# Patient Record
Sex: MALE | Race: BLACK OR AFRICAN AMERICAN | NOT HISPANIC OR LATINO | Employment: FULL TIME | URBAN - METROPOLITAN AREA
[De-identification: names, ages, dates, MRNs, and addresses within clinical notes are randomized per-mention and may not be internally consistent; named-entity substitution may affect disease eponyms.]

---

## 2024-04-05 ENCOUNTER — HOSPITAL ENCOUNTER (EMERGENCY)
Facility: HOSPITAL | Age: 24
Discharge: HOME | End: 2024-04-05
Attending: EMERGENCY MEDICINE
Payer: COMMERCIAL

## 2024-04-05 VITALS
SYSTOLIC BLOOD PRESSURE: 154 MMHG | HEART RATE: 87 BPM | TEMPERATURE: 98 F | OXYGEN SATURATION: 97 % | RESPIRATION RATE: 18 BRPM | BODY MASS INDEX: 33.31 KG/M2 | HEIGHT: 68 IN | DIASTOLIC BLOOD PRESSURE: 91 MMHG | WEIGHT: 219.81 LBS

## 2024-04-05 DIAGNOSIS — M79.641 RIGHT HAND PAIN: Primary | ICD-10-CM

## 2024-04-05 PROCEDURE — 29125 APPL SHORT ARM SPLINT STATIC: CPT | Mod: RT

## 2024-04-05 PROCEDURE — 99283 EMERGENCY DEPT VISIT LOW MDM: CPT | Mod: 25

## 2024-04-05 RX ORDER — IBUPROFEN 800 MG/1
800 TABLET ORAL EVERY 6 HOURS PRN
Qty: 20 TABLET | Refills: 0 | Status: SHIPPED | OUTPATIENT
Start: 2024-04-05

## 2024-04-05 NOTE — ED PROVIDER NOTES
Encounter Date: 4/5/2024       History     Chief Complaint   Patient presents with    Hand Injury     Pt c/o right hand swelling x1 day after hitting something. Pt unable to close fist completely. Pt c/o numbness and tingling, noted  bruising to palm.     24-year-old male presents emergency department complaints of right hand swelling and pain after punching a wall yesterday.  Patient reports associated numbness and tingling to the hand.  Patient denies any other injuries or symptoms.    The history is provided by the patient.     Review of patient's allergies indicates:  No Known Allergies  No past medical history on file.  No past surgical history on file.  No family history on file.     Review of Systems   Constitutional:  Negative for fever.   HENT:  Negative for sore throat.    Respiratory:  Negative for shortness of breath.    Cardiovascular:  Negative for chest pain.   Gastrointestinal:  Negative for nausea.   Genitourinary:  Negative for dysuria.   Musculoskeletal:  Negative for back pain.        +right hand pain   Skin:  Negative for rash.   Neurological:  Negative for weakness.   Hematological:  Does not bruise/bleed easily.   All other systems reviewed and are negative.      Physical Exam     Initial Vitals [04/05/24 1105]   BP Pulse Resp Temp SpO2   (!) 154/91 87 18 98.4 °F (36.9 °C) 97 %      MAP       --         Physical Exam    Constitutional: He appears well-developed and well-nourished. No distress.   HENT:   Head: Normocephalic and atraumatic.   Nose: Nose normal.   Mouth/Throat: Uvula is midline and oropharynx is clear and moist.   Eyes: Conjunctivae and EOM are normal. Pupils are equal, round, and reactive to light.   Neck: Neck supple.   Normal range of motion.  Cardiovascular:  Normal rate and regular rhythm.           Pulmonary/Chest: Effort normal and breath sounds normal. No respiratory distress. He has no decreased breath sounds. He has no wheezes. He has no rales.   Abdominal: Abdomen is  soft. Bowel sounds are normal. There is no abdominal tenderness.   Musculoskeletal:      Right hand: Swelling and tenderness present. No deformity or bony tenderness. Decreased range of motion. Normal sensation. There is no disruption of two-point discrimination. Normal capillary refill. Normal pulse.      Cervical back: Normal range of motion and neck supple.      Comments: There is moderate swelling and ecchymosis noted to the right hand both of the dorsum and palmar aspect, patient reports numbness and tingling to the right thumb, decreased range of motion of right thumb.  There is no deformity noted.     Neurological: He is alert and oriented to person, place, and time. He has normal strength. GCS eye subscore is 4. GCS verbal subscore is 5. GCS motor subscore is 6.   Skin: Skin is warm and dry. Capillary refill takes less than 2 seconds. No rash noted.   Psychiatric: He has a normal mood and affect. His speech is normal and behavior is normal.         ED Course   Procedures  Labs Reviewed - No data to display       Imaging Results              X-Ray Hand 3 view Right (Final result)  Result time 04/05/24 11:51:25      Final result by Jose C Oliveira MD (04/05/24 11:51:25)                   Impression:      No acute osseous findings.      Electronically signed by: Jose C Oliveira MD  Date:    04/05/2024  Time:    11:51               Narrative:    EXAMINATION:  XR HAND COMPLETE 3 VIEW RIGHT    CLINICAL HISTORY:  right hand injury;    TECHNIQUE:  Three views right hand.    COMPARISON:  None    FINDINGS:  Negative for acute fracture or dislocation.    No significant arthritic changes.    No suspicious focal bony abnormality.    Punctate soft tissue calcification adjacent to the base of the 5th metacarpal.                                       Medications - No data to display  Medical Decision Making  Amount and/or Complexity of Data Reviewed  Radiology: ordered.     Details: No acute osseous findings    Risk  Risk  Details: Place patient in a thumb spica splint.  Anti-inflammatories prescribed.  Referral placed for orthopedic follow up.                                      Clinical Impression:  Final diagnoses:  [M79.641] Right hand pain (Primary)          ED Disposition Condition    Discharge Stable          ED Prescriptions       Medication Sig Dispense Start Date End Date Auth. Provider    ibuprofen (ADVIL,MOTRIN) 800 MG tablet Take 1 tablet (800 mg total) by mouth every 6 (six) hours as needed for Pain. 20 tablet 4/5/2024 -- Vitor Briggs Jr., SENTHLI          Follow-up Information       Follow up With Specialties Details Why Contact Info    [facility] - Emergency Dept. Emergency Medicine  If symptoms worsen [id number] [facility] [address] [phone]             Vitor Briggs Jr., FNP  04/05/24 1458

## 2024-04-05 NOTE — Clinical Note
"Shamika"Brockhenryoksana Celaya was seen and treated in our emergency department on 4/5/2024.  He may return to work on 04/09/2024.       If you have any questions or concerns, please don't hesitate to call.      Vitor Briggs Jr., RUPALIP"

## 2024-04-08 ENCOUNTER — OFFICE VISIT (OUTPATIENT)
Dept: ORTHOPEDICS | Facility: CLINIC | Age: 24
End: 2024-04-08
Payer: COMMERCIAL

## 2024-04-08 ENCOUNTER — HOSPITAL ENCOUNTER (OUTPATIENT)
Dept: RADIOLOGY | Facility: HOSPITAL | Age: 24
Discharge: HOME | End: 2024-04-08
Attending: STUDENT IN AN ORGANIZED HEALTH CARE EDUCATION/TRAINING PROGRAM
Payer: COMMERCIAL

## 2024-04-08 ENCOUNTER — PATIENT MESSAGE (OUTPATIENT)
Dept: ORTHOPEDICS | Facility: CLINIC | Age: 24
End: 2024-04-08

## 2024-04-08 VITALS — HEIGHT: 68 IN | WEIGHT: 219.81 LBS | BODY MASS INDEX: 33.31 KG/M2

## 2024-04-08 DIAGNOSIS — M79.641 RIGHT HAND PAIN: Primary | ICD-10-CM

## 2024-04-08 DIAGNOSIS — M79.641 RIGHT HAND PAIN: ICD-10-CM

## 2024-04-08 DIAGNOSIS — S62.141A CLOSED DISPLACED FRACTURE OF HAMATE BONE OF RIGHT WRIST, UNSPECIFIED PORTION OF HAMATE, INITIAL ENCOUNTER: ICD-10-CM

## 2024-04-08 DIAGNOSIS — S63.8X1A CARPOMETACARPAL JOINT SPRAINS, RIGHT, INITIAL ENCOUNTER: ICD-10-CM

## 2024-04-08 DIAGNOSIS — S69.91XA HAND TRAUMA, RIGHT, INITIAL ENCOUNTER: ICD-10-CM

## 2024-04-08 DIAGNOSIS — S69.91XA HAND TRAUMA, RIGHT, INITIAL ENCOUNTER: Primary | ICD-10-CM

## 2024-04-08 PROCEDURE — 99204 OFFICE O/P NEW MOD 45 MIN: CPT | Mod: S$GLB,,, | Performed by: STUDENT IN AN ORGANIZED HEALTH CARE EDUCATION/TRAINING PROGRAM

## 2024-04-08 PROCEDURE — 73130 X-RAY EXAM OF HAND: CPT | Mod: TC,RT

## 2024-04-08 PROCEDURE — 1159F MED LIST DOCD IN RCRD: CPT | Mod: CPTII,S$GLB,, | Performed by: STUDENT IN AN ORGANIZED HEALTH CARE EDUCATION/TRAINING PROGRAM

## 2024-04-08 PROCEDURE — 3008F BODY MASS INDEX DOCD: CPT | Mod: CPTII,S$GLB,, | Performed by: STUDENT IN AN ORGANIZED HEALTH CARE EDUCATION/TRAINING PROGRAM

## 2024-04-08 PROCEDURE — 1160F RVW MEDS BY RX/DR IN RCRD: CPT | Mod: CPTII,S$GLB,, | Performed by: STUDENT IN AN ORGANIZED HEALTH CARE EDUCATION/TRAINING PROGRAM

## 2024-04-08 PROCEDURE — 73130 X-RAY EXAM OF HAND: CPT | Mod: 26,RT,, | Performed by: RADIOLOGY

## 2024-04-08 PROCEDURE — 99999 PR PBB SHADOW E&M-EST. PATIENT-LVL III: CPT | Mod: PBBFAC,,, | Performed by: STUDENT IN AN ORGANIZED HEALTH CARE EDUCATION/TRAINING PROGRAM

## 2024-04-08 PROCEDURE — 97760 ORTHOTIC MGMT&TRAING 1ST ENC: CPT | Mod: S$GLB,,, | Performed by: STUDENT IN AN ORGANIZED HEALTH CARE EDUCATION/TRAINING PROGRAM

## 2024-04-08 PROCEDURE — 73200 CT UPPER EXTREMITY W/O DYE: CPT | Mod: 26,RT,, | Performed by: RADIOLOGY

## 2024-04-08 PROCEDURE — 73200 CT UPPER EXTREMITY W/O DYE: CPT | Mod: TC,RT

## 2024-04-08 RX ORDER — AZELASTINE HYDROCHLORIDE 0.5 MG/ML
1 SOLUTION/ DROPS OPHTHALMIC 2 TIMES DAILY
COMMUNITY
Start: 2023-12-05

## 2024-04-08 RX ORDER — KETOROLAC TROMETHAMINE 10 MG/1
10 TABLET, FILM COATED ORAL
COMMUNITY
Start: 2024-04-05

## 2024-04-08 NOTE — PROGRESS NOTES
Hand Surgery Clinic Note    Chief Complaint  Chief Complaint   Patient presents with    Right Hand - Pain, Injury, Numbness    Right Wrist - Pain, Injury       History of Present Illness  24-year-old right-hand dominant male who has a  presents for evaluation of a right hand injury.  Four days ago, on 04/04/2024, patient punched a wall.  He subsequently presented to the emergency department the following day where x-rays were obtained.  Patient was told that there was no fracture and he was placed in a thumb spica brace.  It was difficult to discern whether there was a tristan dislocation event from the incident.  Patient states that he did notice a bump on his dorsal hand shortly following the incident.  There is no evidence of CMC dislocation on the x-rays which were taken in the emergency department.  Patient states he was some generalized tingling in all his fingers.  He rates his pain as an 8/10.    Review of Systems  Review of systems negative for chest pain, shortness of breath, fevers, chills, nausea/vomiting.    Past Medical History  No past medical history on file.    Past Surgical History  No past surgical history on file.    Allergies  Review of patient's allergies indicates:  No Known Allergies    Family History  No family history on file.    Social History  Social History     Socioeconomic History    Marital status: Single   Tobacco Use    Smoking status: Every Day     Types: Vaping with nicotine     Start date: 01/2023     Passive exposure: Past    Smokeless tobacco: Never       Vital Signs  There were no vitals filed for this visit.    Physical Exam  Constitutional: Appears well-developed and well-nourished. No distress.   HENT:   Head: Normocephalic.   Eyes: EOM are normal.   Pulmonary/Chest: Effort normal.   Neurological: Oriented to person, place, and time.   Psychiatric: Normal mood and affect.     Right Upper Extremity:  No abrasions, lacerations, wounds.  There is moderate swelling  generalized throughout the dorsum of the hand.  I do not palpate a dorsal prominence at the base of the metacarpals consistent with a CMC dislocation.  Patient was able to fully extend all his fingers.  When attempting to make a fist, his fingers are 1-2 cm off of the palm.  Patient demonstrates active flexion and extension at the MCP and IP joints of all 5 fingers.  No tenderness over the dorsal radiocarpal articulation.  No tenderness over the snuffbox.  No tenderness over the distal radius or distal ulna.  No tenderness over the TFCC.  Patient has tenderness over the 2nd-5th CMC joints. 2 point discrimination is 5mm in all 5 fingers.    Imaging  Right-hand x-rays three views were obtained today and independently reviewed by myself.  Additionally right-hand x-rays three views were obtained on 04/05/2024 and independently reviewed by myself.  No tristan CMC dislocation is noted on these x-rays.  There are small avulsion type fragments which are visualized along the distal carpal row, particularly just ulnar to the hamate on the AP view and potentially dorsal to the hamate and/or capitate on the lateral view.    Right hand CT scan without contrast was obtained today and independently reviewed by myself.  A small avulsion type fracture is visualized dorsal to the 3rd CMC joint.  There is possible minimal dorsal subluxation at the 4th CMC joint with an associated avulsion type fracture dorsal to the hamate.  Difficult to tell based on the angle of the sagittal CTs cuts if the 4th CMC joint is slightly subluxed versus reduced.  The 2nd, 3rd, and 5th CMC joints are reduced.    Assessment and Plan  24-year-old right-hand dominant male presents for evaluation of a right hand injury after he punched a wall on 04/04/2024.  The injury is at the CMC articulations.  There are small avulsion fragments visualized.  Difficult to tell if patient may have potentially had a 4th CMC dislocation event.  All of his CMC joints are reduced  today on CT scan though there may be some very minimal dorsal subluxation of the 4th CMC joint.  The CMC joints did not dislocate or sublux when patient attempted to make a fist in clinic today.  I fitted the patient for an Exos brace today.      At least 10 minutes were spent sizing, fitting, and educating for durable medical equipment application today.  This service was performed under the direction of Thao Knight MD.  CPT 67352.     I am going to have him follow up in 1 week with repeat x-rays of the right hand to confirm that the right joints are reduced.  I provided him with a number to the clinic and discussed that if he feels a clunk and a prominence over the dorsum of his hand concerning for a dislocation event, he should call the clinic to be seen the following day.  Follow up in 1 week for re-evaluation.  I also wrote a note for his work stating that he was nonweightbearing to the right upper extremity from the date of his injury (04/04/2024) until 4/15/24.  I will re-evaluate it was visit next week to determine next steps with the job.    Thao Knight MD  Orthopaedic Hand Surgery

## 2024-04-08 NOTE — LETTER
April 8, 2024      [facility]  [address] [facility]  [place] [address]  Phone: [phone]  Fax: [phone]       Patient: Shamika Celaya   YOB: 2000    To Whom It May Concern:    Tangela Celaya  was at [facility] on 04/08/2024. Due to his injury, the patient should not use the right hand from 4/4/2024 - 4/15/2024, at which time he will be re-evaluated. If you have any questions or concerns, or if I can be of further assistance, please do not hesitate to contact me.    Sincerely,      Thao Knight MD

## 2024-04-09 ENCOUNTER — PATIENT MESSAGE (OUTPATIENT)
Dept: ORTHOPEDICS | Facility: CLINIC | Age: 24
End: 2024-04-09
Payer: COMMERCIAL

## 2024-04-10 RX ORDER — TRAMADOL HYDROCHLORIDE 50 MG/1
50 TABLET ORAL EVERY 6 HOURS
Qty: 20 TABLET | Refills: 0 | Status: SHIPPED | OUTPATIENT
Start: 2024-04-10

## 2024-04-10 NOTE — PROGRESS NOTES
Patient called stating he is taking 800mg Ibuprofen with continued pain. One time script for tramadol sent to pharmacy.    Thao Knight MD  Orthopaedic Hand Surgery

## 2024-04-10 NOTE — TELEPHONE ENCOUNTER
Pt came by clinic to complete his portion of FMLA paperwork, advised that Dr. Knight states she will dend a one time Rx of tramadol to his pharm. Pt verbalized understanding and asked for print out of work excuse, which was provided.

## 2024-04-11 DIAGNOSIS — M79.641 RIGHT HAND PAIN: Primary | ICD-10-CM

## 2024-04-15 ENCOUNTER — HOSPITAL ENCOUNTER (OUTPATIENT)
Dept: RADIOLOGY | Facility: HOSPITAL | Age: 24
Discharge: HOME | End: 2024-04-15
Attending: STUDENT IN AN ORGANIZED HEALTH CARE EDUCATION/TRAINING PROGRAM
Payer: COMMERCIAL

## 2024-04-15 ENCOUNTER — OFFICE VISIT (OUTPATIENT)
Dept: ORTHOPEDICS | Facility: CLINIC | Age: 24
End: 2024-04-15
Payer: COMMERCIAL

## 2024-04-15 VITALS — BODY MASS INDEX: 33.31 KG/M2 | WEIGHT: 219.81 LBS | HEIGHT: 68 IN

## 2024-04-15 DIAGNOSIS — M79.641 RIGHT HAND PAIN: ICD-10-CM

## 2024-04-15 DIAGNOSIS — S62.141A CLOSED DISPLACED FRACTURE OF HAMATE BONE OF RIGHT WRIST, UNSPECIFIED PORTION OF HAMATE, INITIAL ENCOUNTER: Primary | ICD-10-CM

## 2024-04-15 DIAGNOSIS — M79.641 RIGHT HAND PAIN: Primary | ICD-10-CM

## 2024-04-15 PROCEDURE — 1160F RVW MEDS BY RX/DR IN RCRD: CPT | Mod: CPTII,S$GLB,, | Performed by: STUDENT IN AN ORGANIZED HEALTH CARE EDUCATION/TRAINING PROGRAM

## 2024-04-15 PROCEDURE — 99999 PR PBB SHADOW E&M-EST. PATIENT-LVL III: CPT | Mod: PBBFAC,,, | Performed by: STUDENT IN AN ORGANIZED HEALTH CARE EDUCATION/TRAINING PROGRAM

## 2024-04-15 PROCEDURE — 97760 ORTHOTIC MGMT&TRAING 1ST ENC: CPT | Mod: S$GLB,,, | Performed by: STUDENT IN AN ORGANIZED HEALTH CARE EDUCATION/TRAINING PROGRAM

## 2024-04-15 PROCEDURE — 1159F MED LIST DOCD IN RCRD: CPT | Mod: CPTII,S$GLB,, | Performed by: STUDENT IN AN ORGANIZED HEALTH CARE EDUCATION/TRAINING PROGRAM

## 2024-04-15 PROCEDURE — 73130 X-RAY EXAM OF HAND: CPT | Mod: TC,RT

## 2024-04-15 PROCEDURE — 99214 OFFICE O/P EST MOD 30 MIN: CPT | Mod: S$GLB,,, | Performed by: STUDENT IN AN ORGANIZED HEALTH CARE EDUCATION/TRAINING PROGRAM

## 2024-04-15 PROCEDURE — 73130 X-RAY EXAM OF HAND: CPT | Mod: 26,RT,, | Performed by: RADIOLOGY

## 2024-04-15 PROCEDURE — 3008F BODY MASS INDEX DOCD: CPT | Mod: CPTII,S$GLB,, | Performed by: STUDENT IN AN ORGANIZED HEALTH CARE EDUCATION/TRAINING PROGRAM

## 2024-04-15 NOTE — PROGRESS NOTES
Hand Surgery Clinic Follow Up Note    Chief Complaint  Chief Complaint   Patient presents with    Right Hand - Pain, Injury    Right Wrist - Pain, Injury       History of Present Illness  24-year-old right-hand dominant male presents for follow up.  He punched a wall on 04/04/2024, 11 days ago, and was found to have a right hand injury resulting in small avulsion fragments to the distal carpal row.  He has been in an Exos ulnar gutter brace.  He has not had any tristan dislocation events since he was last seen in clinic 1 week ago.  He has been nonweightbearing to the right upper extremity.  Paperwork for his job was filled out last week stating that he could return to work tomorrow but with lifting restrictions of less than 2 lb to the right hand from for 4/16/24 to 05/16/2024; also it was specified that patient allowed to wear his Velcro wrist brace as needed.    Review of Systems  Review of systems negative for chest pain, shortness of breath, fevers, chills, nausea/vomiting.    Vital Signs  There were no vitals filed for this visit.    Physical Exam  Constitutional: Appears well-developed and well-nourished. No distress.   HENT:   Head: Normocephalic.   Eyes: EOM are normal.   Pulmonary/Chest: Effort normal.   Neurological: Oriented to person, place, and time.   Psychiatric: Normal mood and affect.     Right Upper Extremity:  No abrasions, lacerations, wounds.  The swelling on the dorsum of the hand has resolved.  No erythema.  No drainage.  Patient was able to make a fist and fully extend all his fingers.  No palpable dorsal bony prominences at the CMC articulations. Patient demonstrates active flexion and extension at the MCP and IP joints of all 5 fingers.  No tenderness over the dorsal radiocarpal articulation.  No tenderness over the snuffbox.  No tenderness over the distal radius or distal ulna.  No tenderness over the TFCC.  Patient has mild tenderness over the 2nd-5th CMC joints and distal carpal row. 2  point discrimination is 5mm in all 5 fingers.     Imaging  Right-hand x-rays three views were obtained today and independently reviewed by myself.  No change in alignment of the CMC joints compared to x-rays taken 1 week ago. There are small avulsion type fragments which are visualized along the distal carpal row, particularly just ulnar to the hamate on the AP view and potentially dorsal to the hamate and/or capitate on the lateral view.     Assessment and Plan  24-year-old right-hand dominant male presents for follow up.  He sustained a right hand injury on 04/04/2024.  CT was obtained due to concern for possible CMC dislocation but this was found to be normal.  Patient has some avulsion fragments of bones of the distal carpal row.  Patient was transitioned to a Velcro wrist brace today.    At least 10 minutes were spent sizing, fitting, and educating for durable medical equipment application today.  This service was performed under the direction of Thao Knight MD.  CPT 40833.    No lifting greater than 2 lb at work from 04/16/2024 until 05/16/2024.  Wear the Velcro wrist brace as needed, patient should begin to wean off of this in the next couple of weeks.  Follow up in 4 weeks for re-evaluation with x-rays of the right hand.  Again, I discussed with the patient that if he experiences a CMC dislocation event (we discussed what this would feel and look like), he should go to the ER or call the clinic within 24 hours.      Thao Knight MD  Orthopaedic Hand Surgery

## 2024-04-18 ENCOUNTER — PATIENT MESSAGE (OUTPATIENT)
Dept: ORTHOPEDICS | Facility: CLINIC | Age: 24
End: 2024-04-18
Payer: COMMERCIAL

## 2024-04-23 ENCOUNTER — PATIENT MESSAGE (OUTPATIENT)
Dept: ORTHOPEDICS | Facility: CLINIC | Age: 24
End: 2024-04-23
Payer: COMMERCIAL

## 2024-04-25 ENCOUNTER — TELEPHONE (OUTPATIENT)
Dept: SPORTS MEDICINE | Facility: CLINIC | Age: 24
End: 2024-04-25
Payer: COMMERCIAL

## 2024-05-13 ENCOUNTER — PATIENT MESSAGE (OUTPATIENT)
Dept: ORTHOPEDICS | Facility: CLINIC | Age: 24
End: 2024-05-13

## 2024-05-13 ENCOUNTER — HOSPITAL ENCOUNTER (OUTPATIENT)
Dept: RADIOLOGY | Facility: HOSPITAL | Age: 24
Discharge: HOME | End: 2024-05-13
Attending: STUDENT IN AN ORGANIZED HEALTH CARE EDUCATION/TRAINING PROGRAM
Payer: COMMERCIAL

## 2024-05-13 ENCOUNTER — OFFICE VISIT (OUTPATIENT)
Dept: ORTHOPEDICS | Facility: CLINIC | Age: 24
End: 2024-05-13
Payer: COMMERCIAL

## 2024-05-13 VITALS — HEIGHT: 68 IN | BODY MASS INDEX: 33.31 KG/M2 | WEIGHT: 219.81 LBS

## 2024-05-13 DIAGNOSIS — S63.8X1A CARPOMETACARPAL JOINT SPRAINS, RIGHT, INITIAL ENCOUNTER: ICD-10-CM

## 2024-05-13 DIAGNOSIS — M79.641 RIGHT HAND PAIN: ICD-10-CM

## 2024-05-13 DIAGNOSIS — S62.141A CLOSED DISPLACED FRACTURE OF HAMATE BONE OF RIGHT WRIST, UNSPECIFIED PORTION OF HAMATE, INITIAL ENCOUNTER: Primary | ICD-10-CM

## 2024-05-13 DIAGNOSIS — M79.641 RIGHT HAND PAIN: Primary | ICD-10-CM

## 2024-05-13 PROCEDURE — 1160F RVW MEDS BY RX/DR IN RCRD: CPT | Mod: CPTII,S$GLB,, | Performed by: STUDENT IN AN ORGANIZED HEALTH CARE EDUCATION/TRAINING PROGRAM

## 2024-05-13 PROCEDURE — 99999 PR PBB SHADOW E&M-EST. PATIENT-LVL III: CPT | Mod: PBBFAC,,, | Performed by: STUDENT IN AN ORGANIZED HEALTH CARE EDUCATION/TRAINING PROGRAM

## 2024-05-13 PROCEDURE — 99213 OFFICE O/P EST LOW 20 MIN: CPT | Mod: S$GLB,,, | Performed by: STUDENT IN AN ORGANIZED HEALTH CARE EDUCATION/TRAINING PROGRAM

## 2024-05-13 PROCEDURE — 1159F MED LIST DOCD IN RCRD: CPT | Mod: CPTII,S$GLB,, | Performed by: STUDENT IN AN ORGANIZED HEALTH CARE EDUCATION/TRAINING PROGRAM

## 2024-05-13 PROCEDURE — 73130 X-RAY EXAM OF HAND: CPT | Mod: 26,RT,, | Performed by: RADIOLOGY

## 2024-05-13 PROCEDURE — 3008F BODY MASS INDEX DOCD: CPT | Mod: CPTII,S$GLB,, | Performed by: STUDENT IN AN ORGANIZED HEALTH CARE EDUCATION/TRAINING PROGRAM

## 2024-05-13 PROCEDURE — 73130 X-RAY EXAM OF HAND: CPT | Mod: TC,RT

## 2024-05-13 RX ORDER — MELOXICAM 7.5 MG/1
7.5 TABLET ORAL
COMMUNITY

## 2024-05-13 NOTE — PROGRESS NOTES
Hand Surgery Clinic Follow Up Note    Chief Complaint  Chief Complaint   Patient presents with    Right Hand - Injury       History of Present Illness  24-year-old right-hand dominant male presents for follow up.  He punched a wall on 04/04/2024, 6 weeks ago.  He was found to have a right hand injury resulting in small avulsion fragments of the distal carpal row.  He was given a Velcro wrist brace at his last visit on 04/15/2024.  Plan at that visit was no lifting greater than 2 lb to the right hand from 04/16/2024 until 05/16/2024.  He has been wearing the Velcro wrist brace as needed.  He has not returned to work at this point.  His pain level is a 6/10.  No abrasions, lacerations, wounds.  He was finish progressively lifting heavier things with minimal pain/limitations.    Review of Systems  Review of systems negative for chest pain, shortness of breath, fevers, chills, nausea/vomiting.    Vital Signs  There were no vitals filed for this visit.    Physical Exam  Constitutional: Appears well-developed and well-nourished. No distress.   HENT:   Head: Normocephalic.   Eyes: EOM are normal.   Pulmonary/Chest: Effort normal.   Neurological: Oriented to person, place, and time.   Psychiatric: Normal mood and affect.     Right Upper Extremity:  No abrasions, lacerations, wounds.  No swelling.  No erythema.  No drainage.  Patient is able to make a fist and fully extend all his fingers.  No palpable dorsal bony prominences at the CMC articulations.  Patient demonstrates active flexion and extension at the MCP and IP joints of all 5 fingers.  No tenderness at the base of the 3/4/5 metacarpals.  Sensation is intact in the median, radial, ulnar nerve distributions.  Palpable radial pulse.    Imaging  Right-hand x-rays three views were obtained today and independently reviewed by myself.  No change in alignment of CMC joints compared to previous set of x-rays.  Fractures have healed at this point.  No dislocation or  subluxation.    Assessment and Plan  24-year-old right-hand dominant male presents for follow up.  He sustained a right hand injury on 04/04/2024.  He it was noted to have some avulsion fragments of bones at the distal carpal row.  Discussed that at this point he can return to all activity as tolerated.  No restrictions.  He can wear the wrist brace intermittently as needed though I recommended he wean off of it was much as possible.  He was recovered full motion at this point.  Follow up in clinic in 6 weeks for re-evaluation with repeat x-rays of the right hand.    Thao Knight MD  Orthopaedic Hand Surgery

## 2024-10-30 ENCOUNTER — PATIENT MESSAGE (OUTPATIENT)
Dept: RESEARCH | Facility: HOSPITAL | Age: 24
End: 2024-10-30
Payer: COMMERCIAL